# Patient Record
Sex: FEMALE | URBAN - METROPOLITAN AREA
[De-identification: names, ages, dates, MRNs, and addresses within clinical notes are randomized per-mention and may not be internally consistent; named-entity substitution may affect disease eponyms.]

---

## 2018-05-07 ENCOUNTER — HOSPITAL ENCOUNTER (OUTPATIENT)
Dept: LAB | Age: 53
Discharge: HOME | End: 2018-05-07

## 2018-05-07 LAB — T-SPOT TB TEST (EMPLOYEE),XTBE: NORMAL

## 2022-12-20 ENCOUNTER — HOSPITAL ENCOUNTER (EMERGENCY)
Age: 57
Discharge: HOME | End: 2022-12-20
Attending: STUDENT IN AN ORGANIZED HEALTH CARE EDUCATION/TRAINING PROGRAM
Payer: COMMERCIAL

## 2022-12-20 ENCOUNTER — APPOINTMENT (OUTPATIENT)
Dept: GENERAL RADIOLOGY | Age: 57
End: 2022-12-20
Attending: PHYSICIAN ASSISTANT
Payer: COMMERCIAL

## 2022-12-20 VITALS
OXYGEN SATURATION: 100 % | RESPIRATION RATE: 20 BRPM | WEIGHT: 248.24 LBS | HEART RATE: 74 BPM | SYSTOLIC BLOOD PRESSURE: 177 MMHG | DIASTOLIC BLOOD PRESSURE: 89 MMHG

## 2022-12-20 DIAGNOSIS — R03.0 ELEVATED BLOOD PRESSURE READING: ICD-10-CM

## 2022-12-20 DIAGNOSIS — M53.9 MULTILEVEL DEGENERATIVE DISC DISEASE: ICD-10-CM

## 2022-12-20 DIAGNOSIS — M54.16 RIGHT LUMBAR RADICULOPATHY: Primary | ICD-10-CM

## 2022-12-20 PROCEDURE — 74011636637 HC RX REV CODE- 636/637: Performed by: PHYSICIAN ASSISTANT

## 2022-12-20 PROCEDURE — 99283 EMERGENCY DEPT VISIT LOW MDM: CPT

## 2022-12-20 PROCEDURE — 72100 X-RAY EXAM L-S SPINE 2/3 VWS: CPT

## 2022-12-20 RX ORDER — CYCLOBENZAPRINE HCL 10 MG
10 TABLET ORAL
Qty: 15 TABLET | Refills: 0 | Status: SHIPPED | OUTPATIENT
Start: 2022-12-20

## 2022-12-20 RX ORDER — PREDNISONE 10 MG/1
TABLET ORAL
Qty: 21 TABLET | Refills: 0 | Status: SHIPPED | OUTPATIENT
Start: 2022-12-20

## 2022-12-20 RX ORDER — PREDNISONE 20 MG/1
60 TABLET ORAL
Status: COMPLETED | OUTPATIENT
Start: 2022-12-20 | End: 2022-12-20

## 2022-12-20 RX ORDER — PANTOPRAZOLE SODIUM 40 MG/1
40 TABLET, DELAYED RELEASE ORAL DAILY
Qty: 20 TABLET | Refills: 0 | Status: SHIPPED | OUTPATIENT
Start: 2022-12-20 | End: 2023-01-09

## 2022-12-20 RX ADMIN — PREDNISONE 60 MG: 20 TABLET ORAL at 15:17

## 2022-12-20 NOTE — DISCHARGE INSTRUCTIONS
Thank You! It was a pleasure taking care of you in our Emergency Department today. We know that when you come to [facility], you are entrusting us with your health, comfort, and safety. Our clinicians honor that trust, and truly appreciate the opportunity to care for you and your loved ones. We also value your feedback. If you receive a survey about your Emergency Department experience today, please fill it out. We care about our patients' feedback, and we listen to what you have to say. Thank you. Tyler Pimentel PA-C      ____________________________________________________  I have included a copy of your lab results and/or radiologic studies from today's visit so you can have them easily available at your follow-up visit. No results found for this or any previous visit (from the past 12 hour(s)). XR SPINE LUMB 2 OR 3 V   Final Result   Lumbar and lumbosacral degenerative disc disease. No acute finding. CT Results  (Last 48 hours)      None          The exam and treatment you received in the Emergency Department were for an urgent problem and are not intended as complete care. It is important that you follow up with a doctor, nurse practitioner, or physician assistant for ongoing care. If your symptoms become worse or you do not improve as expected and you are unable to reach your usual health care provider, you should return to the Emergency Department. We are available 24 hours a day. Please take your discharge instructions with you when you go to your follow-up appointment. If a prescription has been provided, please have it filled as soon as possible to prevent a delay in treatment. Read the entire medication instruction sheet provided to you by the pharmacy.  If you have any questions or reservations about taking the medication due to side effects or interactions with other medications, please call your primary care physician or contact the ER to speak with the charge nurse. Please make an appointment with your family doctor or the physician you were referred to for follow-up of this visit as instructed on your discharge paperwork. Return to the ER if you are unable to be seen or if you are unable to be seen in a timely manner. If you have any problem arranging the follow-up visit, contact the Emergency Department immediately.

## 2022-12-20 NOTE — ED PROVIDER NOTES
EMERGENCY DEPARTMENT HISTORY AND PHYSICAL EXAM      Date: 12/20/2022  Patient Name: Betty Tran    History of Presenting Illness     Chief Complaint   Patient presents with    Buttocks pain     Pain that is sharp from right buttocks radiating down right leg. Onset Saturday. History Provided By: Patient    HPI: Betty Tran, 65 y.o. female with PMHx HTN, per patient and record review, presents  to the ED with cc of mild to moderate, intermittent right lower buttock pain and right leg pain for the past week. States the pain is in her right buttock and radiates down the posterior aspect of right leg. States pain is associated with certain movements, but denies decrease in mobility. States pain is improved with ibuprofen. Denies recent falls or injuries. Denies any bowel or bladder incontinence/retention, or saddle anesthesias. Denies any extremity numbness, weakness, or tingling. Denies any difficulty ambulating or changes in gait. Denies any leg swelling, redness or warmth. Pain also improved with stretching. No prior history of back surgeries. Denies night sweats or weight loss. Denies dysuria or hematuria. Denies fevers, HA, neck pain, CP, SOB, abdominal pain, N/V/D, blood in urine or stool, rashes or weakness. No additional exacerbating alleviating factors. No other complaints at this time. There are no other complaints, changes, or physical findings at this time. PCP: Cristina Hodges MD      Past History     Past Medical History:  No past medical history on file. Past Surgical History:  No past surgical history on file. Family History:  No family history on file. Social History: Allergies:  No Known Allergies  Review of Systems   Review of Systems   Constitutional:  Negative for appetite change, chills and fever. HENT:  Negative for congestion. Eyes:  Negative for pain. Respiratory:  Negative for cough and shortness of breath.     Cardiovascular:  Negative for chest pain and leg swelling. Gastrointestinal:  Negative for abdominal pain, constipation, diarrhea, nausea and vomiting. Genitourinary:  Negative for decreased urine volume, difficulty urinating, dysuria, enuresis, frequency, hematuria and urgency. Musculoskeletal:  Positive for back pain and myalgias. Negative for gait problem, neck pain and neck stiffness. Skin:  Negative for rash. Neurological:  Negative for syncope, weakness, light-headedness, numbness and headaches. All other systems reviewed and are negative. Physical Exam   Physical Exam  Vitals and nursing note reviewed. Constitutional:       General: She is not in acute distress. Appearance: Normal appearance. She is not ill-appearing, toxic-appearing or diaphoretic. Comments: 65 y.o. female   HENT:      Head: Normocephalic and atraumatic. Right Ear: External ear normal.      Left Ear: External ear normal.      Nose: Nose normal.      Mouth/Throat:      Mouth: Mucous membranes are moist.   Eyes:      Extraocular Movements: Extraocular movements intact. Conjunctiva/sclera: Conjunctivae normal.   Cardiovascular:      Rate and Rhythm: Normal rate. Pulses: Normal pulses. Heart sounds: Normal heart sounds. Pulmonary:      Effort: Pulmonary effort is normal. No respiratory distress. Abdominal:      General: There is no distension. Palpations: There is no mass. Tenderness: There is no abdominal tenderness. There is no right CVA tenderness, left CVA tenderness, guarding or rebound. Musculoskeletal:         General: Normal range of motion. Cervical back: Normal range of motion. Right lower leg: No tenderness. Left lower leg: No tenderness. Right foot: Normal capillary refill. Normal pulse. Left foot: Normal capillary refill. Normal pulse. Legs:       Comments: TTP to right buttock/mid glute. No midline tenderness, no point tenderness, no palpable deformities.    Pain radiates down posterior aspect of leg but no tenderness to extremity on palpation. No warmth, erythema or edema. No tenderness to knee or ankle. Compartments soft, negative Kathleen's sign. Distal sensation equal and intact. Strong DP and PT pulses bilaterally. Ambulatory with normal gait. Skin:     General: Skin is warm and dry. Neurological:      General: No focal deficit present. Mental Status: She is alert and oriented to person, place, and time. Psychiatric:         Mood and Affect: Mood normal.         Behavior: Behavior normal.     Diagnostic Study Results     Labs -   No results found for this or any previous visit (from the past 12 hour(s)). Radiologic Studies -   XR SPINE LUMB 2 OR 3 V   Final Result   Lumbar and lumbosacral degenerative disc disease. No acute finding. CT Results  (Last 48 hours)      None          CXR Results  (Last 48 hours)      None          Medical Decision Making   I am the first provider for this patient. I reviewed the vital signs, available nursing notes, past medical history, past surgical history, family history and social history. Vital Signs-Reviewed the patient's vital signs. Patient Vitals for the past 12 hrs:   Pulse Resp BP SpO2   12/20/22 1540 74 20 (!) 177/89 100 %   12/20/22 1431 78 14 (!) 187/84 100 %       Pulse Oximetry Analysis - 100% on RA    Records Reviewed: Nursing Notes and Old Medical Records    Provider Notes (Medical Decision Making):   Patient is a 66 yo female who presents to the ED for evaluation of right buttock pain that radiates down right leg as noted above. History and physical exam consistent with musculoskeletal etiology. No neurological deficits such as bowel or bladder dysfunction or new weakness or numbness in the lower extremities.   Based on history and physical exam there is a low suspicion for vascular etiology such as aortic aneurysm, aortic dissection, mesenteric ischemia, epidural abscess, osteomyelitis, meningitis, metastatic cancer, acute bacterial infection, or other emergent conditions going further evaluation/management acutely at this time. Shared decision making performed and care plan created together, discussed results, diagnosis and treatment plan. Will treat with short course steroid taper and muscle relaxer, counseled additional symptomatic management techniques including PT. PCP follow-up. Spine/ortho follow-up. Verbal return precautions advised. Patient verbalizes understanding and agreement of current plan of care. See ED course note below, discussed elevated blood pressure reading, patient missed her medication today, states she is otherwise asymptomatic. Advised medication compliance and continuing to monitor blood pressures and follow-up with PCP. ED Course:   Initial assessment performed. The patients presenting problems have been discussed, and they are in agreement with the care plan formulated and outlined with them. I have encouraged them to ask questions as they arise throughout their visit. ED Course as of 12/20/22 1609   Tue Dec 20, 2022   1500 Patient states she forgot to take hr BP medication this morning (lisinopril, but she is unsure what dose) States she usually is very compliant with this and her blood pressure is usually well controlled  [TL]      ED Course User Index  [TL] ROSA Leblanc       Disposition:  Discharge     PLAN:  1. Discharge Medication List as of 12/20/2022  3:48 PM        START taking these medications    Details   cyclobenzaprine (FLEXERIL) 10 mg tablet Take 1 Tablet by mouth three (3) times daily as needed for Muscle Spasm(s). , Normal, Disp-15 Tablet, R-0      predniSONE (STERAPRED DS) 10 mg dose pack Take as instructed on pack, Normal, Disp-21 Tablet, R-0      pantoprazole (Protonix) 40 mg tablet Take 1 Tablet by mouth daily for 20 days. , Normal, Disp-20 Tablet, R-0             2.   Follow-up Information       Follow up With Specialties Details Why Contact Info    [facility] EMERGENCY DEPT Emergency Medicine  As needed, If symptoms worsen [address], [address] In 1 week  [address] [address]  [address],  Orthopaedic Surgery of the Spine Physician, Orthopedic Surgery In 1 week spine specialist [address] [facility] 21       [address]  In 1 week  [address] [facility] [address]    Antoni Richter MD Neurosurgery In 1 week spine specialist [address] [facility] 310 [facility] [address] [address]  In 1 week spine specialist [address] [facility] 200 [facility] [address]          Return to ED if worse     Diagnosis     Clinical Impression:   1. Right lumbar radiculopathy    2. Elevated blood pressure reading    3.  Multilevel degenerative disc disease

## 2022-12-20 NOTE — Clinical Note
[address]  [facility] EMERGENCY DEPT  [address] [facility]  David Gutierrez [address] [phone]    Work/School Note    Date: 12/20/2022    To Whom It May concern:    Betty Mckee was seen and treated today in the emergency room by the following provider(s):  Attending Provider: Nancy Collins MD  Physician Assistant: Linda Cantrell, [address] [place], [place] [address]f[place]. Betty Mckee is excused from work/school on 12/20/22 and 12/21/22. She is medically clear to return to work/school on 12/22/2022.        Sincerely,          ROSA Stahl

## 2022-12-20 NOTE — Clinical Note
[address]  [facility] EMERGENCY DEPT  [address] [place] [id number] [phone]    Work/School Note    Date: 12/20/2022    To Whom It May concern:    Betty Patel was seen and treated today in the emergency room by the following provider(s):  Attending Provider: Kit Ceron MD  Physician Assistant: Loren Stoll, [address] [place] [address] [place]. Betty Patel is excused from work/school on 12/20/22 and 12/21/22. She is medically clear to return to work/school on 12/22/2022.        Sincerely,          ROSA Estrada

## 2023-01-03 ENCOUNTER — OFFICE VISIT (OUTPATIENT)
Dept: ORTHOPEDIC SURGERY | Age: 58
End: 2023-01-03
Payer: COMMERCIAL

## 2023-01-03 VITALS — WEIGHT: 235 LBS | HEIGHT: 65 IN | BODY MASS INDEX: 39.15 KG/M2

## 2023-01-03 DIAGNOSIS — M43.16 SPONDYLOLISTHESIS OF LUMBAR REGION: ICD-10-CM

## 2023-01-03 DIAGNOSIS — M54.16 SPINAL STENOSIS OF LUMBAR REGION WITH RADICULOPATHY: ICD-10-CM

## 2023-01-03 DIAGNOSIS — M48.061 SPINAL STENOSIS OF LUMBAR REGION WITH RADICULOPATHY: ICD-10-CM

## 2023-01-03 DIAGNOSIS — G89.29 CHRONIC RIGHT-SIDED LOW BACK PAIN WITH RIGHT-SIDED SCIATICA: Primary | ICD-10-CM

## 2023-01-03 DIAGNOSIS — M54.41 CHRONIC RIGHT-SIDED LOW BACK PAIN WITH RIGHT-SIDED SCIATICA: Primary | ICD-10-CM

## 2023-01-03 DIAGNOSIS — M51.36 DEGENERATIVE DISC DISEASE, LUMBAR: ICD-10-CM

## 2023-01-03 RX ORDER — LISINOPRIL AND HYDROCHLOROTHIAZIDE 12.5; 2 MG/1; MG/1
1 TABLET ORAL DAILY
COMMUNITY
Start: 2022-12-13

## 2023-01-03 RX ORDER — MELOXICAM 15 MG/1
15 TABLET ORAL DAILY
Qty: 14 TABLET | Refills: 1 | Status: SHIPPED | OUTPATIENT
Start: 2023-01-03

## 2023-01-03 NOTE — PROGRESS NOTES
Betty Lyon (: 1965) is a 65 y.o. female here for evaluation of the following chief complaint(s):  Back Pain (Follow up from ER.)       ASSESSMENT/PLAN:  Below is the assessment and plan developed based on review of pertinent history, physical exam, labs, studies, and medications. 1. Chronic right-sided low back pain with right-sided sciatica  -     XR SPINE LUMBAR BEND/FLEXION EXTENSION; Future  -     REFERRAL TO PHYSICAL THERAPY; Future  -     meloxicam (Mobic) 15 mg tablet; Take 1 Tablet by mouth daily. , Normal, Disp-14 Tablet, R-1  2. Spondylolisthesis of lumbar region  -     REFERRAL TO PHYSICAL THERAPY; Future  -     meloxicam (Mobic) 15 mg tablet; Take 1 Tablet by mouth daily. , Normal, Disp-14 Tablet, R-1  3. Spinal stenosis of lumbar region with radiculopathy  -     REFERRAL TO PHYSICAL THERAPY; Future  -     meloxicam (Mobic) 15 mg tablet; Take 1 Tablet by mouth daily. , Normal, Disp-14 Tablet, R-1  4. Degenerative disc disease, lumbar  -     REFERRAL TO PHYSICAL THERAPY; Future  -     meloxicam (Mobic) 15 mg tablet; Take 1 Tablet by mouth daily. , Normal, Disp-14 Tablet, R-1      Return in about 6 weeks (around 2023) for Follow up after PT & Meloxicam 15mg qD for 2 wks. Red flag symptoms discussed with the patient. Patient is to present to the emergency department if any of these symptoms occur. Patient verbalized understanding and agrees to proceed with the aforementioned plan. Thank you for allowing me to participate in the care of this patient. SUBJECTIVE/OBJECTIVE:    HPI    Patient is a pleasant 65 y.o. F who presents with atraumatic chronic isolated right ankle pain. History of lower extremity radicular type pain. Presented to the ER several weeks ago and was subsequently referred to orthopedics. Patient ambulates without assist.  Normal ambulatory capacity. No gait instability. No fall history. No upper extremity dexterity issues.   No bowel/bladder control issues. No other red flag symptoms. Therapies (Rx/PT/Injections): None. Chief Complaint   Patient presents with    Back Pain     Follow up from ER. Current Outpatient Medications   Medication Sig    lisinopril-hydroCHLOROthiazide (PRINZIDE, ZESTORETIC) 20-12.5 mg per tablet Take 1 Tablet by mouth daily. meloxicam (Mobic) 15 mg tablet Take 1 Tablet by mouth daily. pantoprazole (Protonix) 40 mg tablet Take 1 Tablet by mouth daily for 20 days. cyclobenzaprine (FLEXERIL) 10 mg tablet Take 1 Tablet by mouth three (3) times daily as needed for Muscle Spasm(s). (Patient not taking: Reported on 1/3/2023)    predniSONE (STERAPRED DS) 10 mg dose pack Take as instructed on pack (Patient not taking: Reported on 1/3/2023)     No current facility-administered medications for this visit. History reviewed. No pertinent past medical history. History reviewed. No pertinent surgical history. History reviewed. No pertinent family history.   Social History     Tobacco Use    Smoking status: Never     Passive exposure: Never    Smokeless tobacco: Never   Vaping Use    Vaping Use: Never used   Substance Use Topics    Alcohol use: Never    Drug use: Never      Social History     Tobacco Use   Smoking Status Never    Passive exposure: Never   Smokeless Tobacco Never     Social History     Substance and Sexual Activity   Alcohol Use Never       Review of Systems  Red flag symptoms: None  Bowel/Bladder/Saddle Anesthesia: Denies  Weakness/Sensory Disturbance: Denies  Ambulation/Falls: Ambulates without assist, no fall history  Constitutional Symptoms (F/C/NS/WL): Denies    Ht 5' 4.5\" (1.638 m)   Wt 235 lb (106.6 kg)   BMI 39.71 kg/m²      Physical Exam    GENERAL:  AAOx3, appears stated age, no distress  Body habitus: Overweight    LOWER EXTREMITIES:  Gait: Intact heel toe and tandem gait   Motor: 5/5 in all myotomes L3-S1 bilaterally  Sensory: Intact to light touch in all dermatomes L4-S1 bilaterally  Reflexes: Normal L4 and S1 bilaterally  Pathological reflexes: No sustained clonus, downgoing Babinski bilaterally   Special tests: Negative seated SLR bilaterally    UPPER EXTREMITIES:  Motor: 5/5 in all myotomes C5-T1 bilaterally  Sensory: Intact to light touch in all dermatomes C5-T1 bilaterally  Reflexes: Normal C5-C7 reflexes bilaterally  Pathological reflexes: Negative Bud's bilaterally  Special tests: Negative Spurling's    NECK/BACK:  Integumentary: Normal  Palpation/ROM: Paraspinal tenderness to palpation; decreased range of motion in all planes, primarily flexion/extension/rotation, secondary to pain and stiffness      IMAGING:    XR Results (most recent):  Results from Appointment encounter on 01/03/23    XR SPINE LUMBAR BEND/FLEXION EXTENSION    Narrative  2 view lumbar spine, flexion and extension, with incomplete views of the sacrum on flexion. Grade 1/2 L4-L5 anterolisthesis. No obvious pars defects. Some subtle instability on dynamic films. Comparison films from December 20, 2022. An electronic signature was used to authenticate this note.   -- Valeria Villalta, DO

## 2023-01-09 ENCOUNTER — OFFICE VISIT (OUTPATIENT)
Dept: ORTHOPEDIC SURGERY | Age: 58
End: 2023-01-09
Payer: COMMERCIAL

## 2023-01-09 DIAGNOSIS — R26.81 UNSTEADINESS ON FEET: ICD-10-CM

## 2023-01-09 DIAGNOSIS — M54.41 ACUTE BILATERAL LOW BACK PAIN WITH RIGHT-SIDED SCIATICA: Primary | ICD-10-CM

## 2023-01-09 DIAGNOSIS — M51.36 DEGENERATIVE DISC DISEASE, LUMBAR: ICD-10-CM

## 2023-01-09 PROCEDURE — 97162 PT EVAL MOD COMPLEX 30 MIN: CPT | Performed by: PHYSICAL THERAPIST

## 2023-01-09 PROCEDURE — 97110 THERAPEUTIC EXERCISES: CPT | Performed by: PHYSICAL THERAPIST

## 2023-01-09 NOTE — PROGRESS NOTES
atient Name: Marin Hollingsworth  Date:2023  : 1965  [x]  Patient  Verified  Payor: Xin Schwab / Plan: Cortney Gonzalez / Product Type: PPO /    Total Treatment Time (min): 40 min  1:1 Treatment Time ( [address] only):    Tierney Carpenter,   1. Acute bilateral low back pain with right-sided sciatica  2. Chronic right-sided low back pain with right-sided sciatica  -     REFERRAL TO PHYSICAL THERAPY  3. Spondylolisthesis of lumbar region  -     REFERRAL TO PHYSICAL THERAPY  4. Spinal stenosis of lumbar region with radiculopathy  -     REFERRAL TO PHYSICAL THERAPY  5. Degenerative disc disease, lumbar  -     REFERRAL TO PHYSICAL THERAPY      Subjective:    Patient is a 65 y.o. female referred to physical therapy by Dr. Josey Akbar for bilateral low back pain with right side sciatica. Patient states she had back pain for 1 week and was having to take medicine every hour and a half. Patient went to ED and was given a steroid with great relief. Patient states her referred pain into her right lower extremity was relieved but has continued right ankle pain. Patient demonstrated to physical therapist what home exercises she has been doing that she thought helped. Patient continues to take Tylenol for pain. Patient states her pain is worse in the a.m. but after exercising, only her right ankle pain persists. Patient has begun standing for work. Past medical history and medication list can otherwise be reviewed per the EHR. Objective:  AROM  Lumbar  FF: Fingertips to shins  Ext: 30%  BSB: Fingers tips to below lateral joint line at knee    Ex: 20 min  Treatment today to include instruction in a home exercise program as well as providing patient with written and visual handouts. Man:     NMR: Right lower extremity sciatica       All questions were addressed.           Assessment:    Patient presents with increased pain and decreased ROM, strength, and mobility consistent with right lower extremity sciatica, mainly in the right ankle. Patient will benefit from skilled PT to address below goals. Long Term Goals. 8 weeks  Patient will report centralized LBP to be consisitently less than or equal to 1/10 with all home/work/community/recreational ADL activity. Patient will report zero radicular pain with home/work/community/recreational ADL activity. Pt. Will return to premorbid activities such as walking and dancing for exercise. Short Term Goals. 2 visits. Patient will demonstrate independence with HEP. Pt. Will sleep through the night without waking 2nd pain    Plan:  Plan of care: Physical therapy consisted of frequency of 2/week for the next 8 weeks. Physical therapy will consist of therapeutic exercise, modalities, patient education, neuromuscular reeducation, manual therapy, therapeutic activity, dry needling, and instruction in home exercise program as appropriate. Eval  Ex: 20 min  Man:   NMR:       The referring physician has reviewed and approved this evaluation and plan of care as noted by the electronic signature attached to note.     Tiffanie Vieyra, MSPT, DPT

## 2023-02-14 ENCOUNTER — TELEPHONE (OUTPATIENT)
Dept: ORTHOPEDIC SURGERY | Age: 58
End: 2023-02-14

## 2023-02-14 ENCOUNTER — OFFICE VISIT (OUTPATIENT)
Dept: ORTHOPEDIC SURGERY | Age: 58
End: 2023-02-14
Payer: COMMERCIAL

## 2023-02-14 VITALS — BODY MASS INDEX: 39.15 KG/M2 | WEIGHT: 235 LBS | HEIGHT: 65 IN

## 2023-02-14 DIAGNOSIS — M48.061 SPINAL STENOSIS OF LUMBAR REGION WITH RADICULOPATHY: ICD-10-CM

## 2023-02-14 DIAGNOSIS — M51.36 DEGENERATIVE DISC DISEASE, LUMBAR: Primary | ICD-10-CM

## 2023-02-14 DIAGNOSIS — M54.41 CHRONIC RIGHT-SIDED LOW BACK PAIN WITH RIGHT-SIDED SCIATICA: ICD-10-CM

## 2023-02-14 DIAGNOSIS — G89.29 CHRONIC RIGHT-SIDED LOW BACK PAIN WITH RIGHT-SIDED SCIATICA: ICD-10-CM

## 2023-02-14 DIAGNOSIS — M43.16 SPONDYLOLISTHESIS OF LUMBAR REGION: ICD-10-CM

## 2023-02-14 DIAGNOSIS — M54.16 SPINAL STENOSIS OF LUMBAR REGION WITH RADICULOPATHY: ICD-10-CM

## 2023-02-14 PROCEDURE — 99204 OFFICE O/P NEW MOD 45 MIN: CPT | Performed by: STUDENT IN AN ORGANIZED HEALTH CARE EDUCATION/TRAINING PROGRAM

## 2023-02-14 NOTE — PROGRESS NOTES
Betty Tran (: 1965) is a 65 y.o. female here for evaluation of the following chief complaint(s):  Back Pain and Ankle Pain (Back pain has improved with PT. Aida Huffman )       ASSESSMENT/PLAN:  Below is the assessment and plan developed based on review of pertinent history, physical exam, labs, studies, and medications. 1. Degenerative disc disease, lumbar  2. Spondylolisthesis of lumbar region  3. Chronic right-sided low back pain with right-sided sciatica  4. Spinal stenosis of lumbar region with radiculopathy    Return in about 1 year (around 2024) for Routine Follow Up (Non-Op). Patient is doing well with conservative care including physical therapy, home exercise program, and anti-inflammatory medications. I will see her back in 1 year for routine evaluation and repeat x-rays. Red flag symptoms discussed with the patient. Patient is to present to the emergency department if any of these symptoms occur. Patient verbalized understanding and agrees to proceed with the aforementioned plan. Thank you for allowing me to participate in the care of this patient. SUBJECTIVE/OBJECTIVE:  HPI  Patient is a pleasant 55-year-old female who is well-known to the spine service. She is doing much better after conservative care for her low back pain with radiculopathy. She has been participating in physical therapy. Review of Systems  See above HPI and prior clinic notes for full ROS     Ht 5' 4.5\" (1.638 m)   Wt 235 lb (106.6 kg)   BMI 39.71 kg/m²    Physical Exam  No interval change in PE, grossly motor/sensory intact, no new neurological deficits    IMAGING:  No new imaging. An electronic signature was used to authenticate this note.   -- Enid Alexandre DO

## 2023-02-14 NOTE — PROGRESS NOTES
1. Have you been to the ER, urgent care clinic since your last visit? Hospitalized since your last visit? No    2. Have you seen or consulted any other health care providers outside of the [address] [place] [address] since your last visit? Include any pap smears or colon screening. No  Chief Complaint   Patient presents with    Back Pain    Ankle Pain     Back pain has improved with PTAleks Shi

## 2023-02-14 NOTE — TELEPHONE ENCOUNTER
Printed out patient's work excuse letter and put it at the  for patient to pickup.   [id number] [address]